# Patient Record
Sex: FEMALE | Race: OTHER | HISPANIC OR LATINO | Employment: UNEMPLOYED | URBAN - METROPOLITAN AREA
[De-identification: names, ages, dates, MRNs, and addresses within clinical notes are randomized per-mention and may not be internally consistent; named-entity substitution may affect disease eponyms.]

---

## 2017-01-14 ENCOUNTER — HOSPITAL ENCOUNTER (EMERGENCY)
Facility: HOSPITAL | Age: 37
Discharge: HOME | End: 2017-01-14
Attending: EMERGENCY MEDICINE | Admitting: EMERGENCY MEDICINE
Payer: COMMERCIAL

## 2017-01-14 VITALS
DIASTOLIC BLOOD PRESSURE: 70 MMHG | WEIGHT: 115 LBS | BODY MASS INDEX: 21.16 KG/M2 | OXYGEN SATURATION: 99 % | HEART RATE: 85 BPM | TEMPERATURE: 98.1 F | RESPIRATION RATE: 16 BRPM | HEIGHT: 62 IN | SYSTOLIC BLOOD PRESSURE: 132 MMHG

## 2017-01-14 DIAGNOSIS — L02.91 ABSCESS: Primary | ICD-10-CM

## 2017-01-14 PROCEDURE — 99282 EMERGENCY DEPT VISIT SF MDM: CPT

## 2017-01-14 RX ORDER — LIDOCAINE HYDROCHLORIDE AND EPINEPHRINE 10; 10 MG/ML; UG/ML
10 INJECTION, SOLUTION INFILTRATION; PERINEURAL ONCE
Status: COMPLETED | OUTPATIENT
Start: 2017-01-14 | End: 2017-01-14

## 2017-01-14 RX ORDER — CEPHALEXIN 500 MG/1
500 CAPSULE ORAL 4 TIMES DAILY
Qty: 40 CAPSULE | Refills: 0 | Status: SHIPPED | OUTPATIENT
Start: 2017-01-14 | End: 2017-01-24

## 2017-01-14 RX ORDER — SULFAMETHOXAZOLE AND TRIMETHOPRIM 800; 160 MG/1; MG/1
2 TABLET ORAL ONCE
Qty: 40 TABLET | Refills: 0 | Status: SHIPPED | OUTPATIENT
Start: 2017-01-14 | End: 2017-01-14

## 2017-01-14 RX ORDER — SULFAMETHOXAZOLE AND TRIMETHOPRIM 800; 160 MG/1; MG/1
2 TABLET ORAL ONCE
Status: DISCONTINUED | OUTPATIENT
Start: 2017-01-14 | End: 2017-01-14

## 2017-01-14 RX ORDER — SULFAMETHOXAZOLE AND TRIMETHOPRIM 800; 160 MG/1; MG/1
2 TABLET ORAL ONCE
Status: COMPLETED | OUTPATIENT
Start: 2017-01-14 | End: 2017-01-14

## 2017-01-14 RX ORDER — SULFAMETHOXAZOLE AND TRIMETHOPRIM 800; 160 MG/1; MG/1
2 TABLET ORAL 2 TIMES DAILY
Qty: 40 TABLET | Refills: 0 | Status: SHIPPED | OUTPATIENT
Start: 2017-01-14 | End: 2017-01-24

## 2017-01-14 RX ORDER — CEPHALEXIN 250 MG/1
500 CAPSULE ORAL ONCE
Status: COMPLETED | OUTPATIENT
Start: 2017-01-14 | End: 2017-01-14

## 2017-01-14 RX ADMIN — CEPHALEXIN 500 MG: 250 CAPSULE ORAL at 15:54

## 2017-01-14 RX ADMIN — LIDOCAINE HYDROCHLORIDE,EPINEPHRINE BITARTRATE 10 ML: 10; .01 INJECTION, SOLUTION INFILTRATION; PERINEURAL at 14:31

## 2017-01-14 RX ADMIN — SULFAMETHOXAZOLE AND TRIMETHOPRIM 2 TABLET: 800; 160 TABLET ORAL at 15:54

## 2017-01-16 ENCOUNTER — HOSPITAL ENCOUNTER (EMERGENCY)
Facility: HOSPITAL | Age: 37
Discharge: HOME | End: 2017-01-16
Admitting: EMERGENCY MEDICINE
Payer: COMMERCIAL

## 2017-01-16 VITALS
BODY MASS INDEX: 21.18 KG/M2 | RESPIRATION RATE: 16 BRPM | SYSTOLIC BLOOD PRESSURE: 129 MMHG | HEIGHT: 62 IN | TEMPERATURE: 98.1 F | WEIGHT: 115.08 LBS | OXYGEN SATURATION: 97 % | HEART RATE: 77 BPM | DIASTOLIC BLOOD PRESSURE: 81 MMHG

## 2017-01-16 DIAGNOSIS — L02.91 ABSCESS: Primary | ICD-10-CM

## 2017-01-16 PROCEDURE — 99282 EMERGENCY DEPT VISIT SF MDM: CPT

## 2017-07-03 ENCOUNTER — ALLSCRIPTS OFFICE VISIT (OUTPATIENT)
Dept: OTHER | Facility: OTHER | Age: 37
End: 2017-07-03

## 2017-07-24 ENCOUNTER — GENERIC CONVERSION - ENCOUNTER (OUTPATIENT)
Dept: OTHER | Facility: OTHER | Age: 37
End: 2017-07-24

## 2018-01-14 NOTE — PROGRESS NOTES
Chief Complaint  PT  CAME INTO THE OFFICE TODAY FOR A 2ND STEP PPD ORDERED BY AUDREY METZGER ON 7/3/17  0 1 ML GIVEN IN LEFT FOREARM AND PT  INSTRUCTED TO RETURN TO OFFICE ON 7/26 OR NO LATER THAN 7/27 FOR READING  FORM IN FLORES BOX TO BE FILLED OUT AND GIVEN BACK TO PT  AT THAT TIME      Active Problems    1  Poor appetite (783 0) (R63 0)   2  Screening examination for pulmonary tuberculosis (V74 1) (Z11 1)    Current Meds   1  Cyproheptadine HCl - 4 MG Oral Tablet; Take 1 tablet twice daily; Therapy: [id number] to ([facility])  Requested for: [id number]; Last   Rx:[id number] Ordered    Allergies    1  No Known Drug Allergies    2   Seasonal    Signatures   Electronically signed by : Nestor Hong, ; Jul 24 2017 10:07AM EST                       (Author)    Electronically signed by : Lisandra Arnold; Jul 24 2017 10:49AM EST                       (Author)    Electronically signed by : Anjelica Doan DO; Jul 24 2017 10:53AM EST                       (Review)

## 2018-01-15 VITALS
DIASTOLIC BLOOD PRESSURE: 80 MMHG | HEIGHT: 61 IN | HEART RATE: 64 BPM | BODY MASS INDEX: 20.98 KG/M2 | SYSTOLIC BLOOD PRESSURE: 110 MMHG | TEMPERATURE: 98 F | WEIGHT: 111.11 LBS

## 2018-03-22 ENCOUNTER — HOSPITAL ENCOUNTER (OUTPATIENT)
Facility: HOSPITAL | Age: 38
End: 2018-03-22
Attending: OBSTETRICS & GYNECOLOGY | Admitting: OBSTETRICS & GYNECOLOGY

## 2018-03-22 ENCOUNTER — HOSPITAL ENCOUNTER (OUTPATIENT)
Dept: LABOR AND DELIVERY | Facility: HOSPITAL | Age: 38
Discharge: HOME | End: 2018-03-22

## 2020-01-23 ENCOUNTER — TELEPHONE (OUTPATIENT)
Dept: INTERNAL MEDICINE CLINIC | Facility: CLINIC | Age: 40
End: 2020-01-23

## 2020-01-23 NOTE — TELEPHONE ENCOUNTER
Called patient to schedule an appointment, patient has not been seen since 2017  Patient informed me she [place] out [place] and will no longer be a patient with our office  Patient has not yet established care with a new PCP  Patient stated she is waiting for her insurance to activate on 2/1/2020 to then schedule an appointment with a new PCP  Patient will call us to update her pcp once she is established